# Patient Record
Sex: FEMALE | Race: OTHER | HISPANIC OR LATINO | URBAN - METROPOLITAN AREA
[De-identification: names, ages, dates, MRNs, and addresses within clinical notes are randomized per-mention and may not be internally consistent; named-entity substitution may affect disease eponyms.]

---

## 2024-08-08 ENCOUNTER — EMERGENCY (EMERGENCY)
Facility: HOSPITAL | Age: 39
LOS: 1 days | Discharge: HOME | End: 2024-08-08
Attending: EMERGENCY MEDICINE
Payer: SELF-PAY

## 2024-08-08 VITALS
WEIGHT: 143.3 LBS | SYSTOLIC BLOOD PRESSURE: 141 MMHG | RESPIRATION RATE: 18 BRPM | OXYGEN SATURATION: 98 % | HEART RATE: 77 BPM | TEMPERATURE: 98 F | DIASTOLIC BLOOD PRESSURE: 103 MMHG

## 2024-08-08 PROCEDURE — 12001 RPR S/N/AX/GEN/TRNK 2.5CM/<: CPT

## 2024-08-08 PROCEDURE — T1013: CPT

## 2024-08-08 PROCEDURE — 99282 EMERGENCY DEPT VISIT SF MDM: CPT | Mod: 25

## 2024-08-08 PROCEDURE — 99284 EMERGENCY DEPT VISIT MOD MDM: CPT | Mod: 25

## 2024-08-08 RX ORDER — CEPHALEXIN 250 MG
500 CAPSULE ORAL EVERY 12 HOURS
Refills: 0 | Status: ACTIVE | OUTPATIENT
Start: 2024-08-08 | End: 2025-07-07

## 2024-08-08 RX ORDER — IBUPROFEN 200 MG
600 TABLET ORAL ONCE
Refills: 0 | Status: ACTIVE | OUTPATIENT
Start: 2024-08-08 | End: 2024-08-08

## 2024-08-08 RX ORDER — CEFAZOLIN SODIUM 10 G
1000 VIAL (EA) INJECTION ONCE
Refills: 0 | Status: DISCONTINUED | OUTPATIENT
Start: 2024-08-08 | End: 2024-08-08

## 2024-08-08 RX ORDER — KETOROLAC TROMETHAMINE 10 MG
30 TABLET ORAL ONCE
Refills: 0 | Status: DISCONTINUED | OUTPATIENT
Start: 2024-08-08 | End: 2024-08-08

## 2024-08-08 RX ORDER — CLOSTRIDIUM TETANI TOXOID ANTIGEN (FORMALDEHYDE INACTIVATED), CORYNEBACTERIUM DIPHTHERIAE TOXOID ANTIGEN (FORMALDEHYDE INACTIVATED), BORDETELLA PERTUSSIS TOXOID ANTIGEN (GLUTARALDEHYDE INACTIVATED), BORDETELLA PERTUSSIS FILAMENTOUS HEMAGGLUTININ ANTIGEN (FORMALDEHYDE INACTIVATED), BORDETELLA PERTUSSIS PERTACTIN ANTIGEN, AND BORDETELLA PERTUSSIS FIMBRIAE 2/3 ANTIGEN 5; 2; 2.5; 5; 3; 5 [LF]/.5ML; [LF]/.5ML; UG/.5ML; UG/.5ML; UG/.5ML; UG/.5ML
0.5 INJECTION, SUSPENSION INTRAMUSCULAR ONCE
Refills: 0 | Status: ACTIVE | OUTPATIENT
Start: 2024-08-08 | End: 2024-08-08

## 2024-08-08 NOTE — ED PROVIDER NOTE - NSFOLLOWUPINSTRUCTIONS_ED_ALL_ED_FT
Educación para el paciente: Suturas y grapas (Conceptos Básicos)  Redactado por los médicos y editores de [employer]  There is a newer version of this topic available in English.Hay luis versión de bethany artículo más actualizada disponible en Inglés.  Trixie el Descargo de responsabilidad al final de esta página.    ¿Qué son las suturas?  Las suturas son luis forma en que los médicos pueden cerrar algunos tipos de adam. El médico utiliza aguja e hilo especiales para las suturas, cose los bordes del jerod para unirlos y da puntadas para mantener las suturas en fernandez lugar (figura 1). Las suturas también se llaman “puntos”.    Existen dos tipos principales de sutura:    ?Absorbible – Estas suturas se disuelven con el paso del tiempo. No es necesario sacarlas.    ?No absorbible – Estas suturas se deben sacar después de cierto tiempo. No se disuelven.    En algunos casos (por ejemplo, si tiene un jerod muy profundo), el médico podría colocarle suturas y además algo llamado "pegamento para la piel" o "adhesivo tisular".    ¿Qué son las grapas?  Otra forma que utilizan los médicos para cerrar los adam son las grapas. Las grapas que se usan para curaciones médicas son diferentes a las que se usan para el papel. Para colocarlas, los médicos usan luis grapadora especial (figura 2). Las grapas se deben sacar después de cierto tiempo, al igual que las suturas no absorbibles.    ¿Cómo sé si necesito suturas o grapas?  Un médico o enfermero deberá decidirlo tras examinar el jerod. En general, se necesitan suturas o grapas si el jerod es kassandra, disparejo o tan profundo que atraviesa la piel. Los adam se curan solos sin necesidad de suturas o grapas, aleksandra estas contribuyen a que la curación sea más rápida y no quede mucha cicatriz.    Los adam y las raspaduras pequeños que no son muy profundos por lo general no necesitan suturas. Si se corta y no sabe si necesita suturas, consulte a fernandez médico o enfermero.    ¿Qué pasa cuando me ponen suturas o grapas?  Antes de suturar o colocar las grapas, el médico limpiará susie la herida. Además, le dará un medicamento para adormecer el área, para que no sienta dolor cuando le pongan las suturas o las grapas.    Después de que el médico suture el jerod o le coloque grapas, cubrirá la liane con luis gasa o venda.    ¿Por qué es importante cuidar las suturas o las grapas?  Es importante cuidar las suturas o las grapas para que la herida se cure susie y no se infecte.    ¿Cómo se cuidan las suturas o las grapas?  El médico o enfermero le dará instrucciones específicas, según el tipo de suturas que tenga y la liane del cuerpo donde estén. Las grapas requieren el mismo tipo de cuidado que las suturas no absorbibles.    Estos son algunos consejos generales:    ?Mantenga las suturas o las grapas secas y tapadas con luis venda. Las suturas no absorbibles y las grapas deben mantenerse secas mariaelena 1 a 2 días. Las suturas absorbibles a veces deben mantenerse secas mariaelena más tiempo. El médico o enfermero le dirá exactamente mariaelena cuánto tiempo deberá mantener las suturas secas.    ?Cuando ya no sea necesario mantener las suturas o las grapas secas, lávelas en forma delicada con jabón y agua mientras se ducha. No sumerja las suturas o las grapas en agua, por ejemplo en luis bañera, luis piscina o un kaela. Si lo hace, el proceso de curación puede volverse más lento y es posible que aumenten las posibilidades de contraer luis infección.    ?Después de concetta las suturas o las grapas, séquelas con golpecitos leves y aplíqueles algún ungüento antibiótico    ?Cubra las suturas o las grapas con luis venda o gasa, primo que el médico o enfermero le haya recomendado lo contrario    ?Evite realizar actividades o deportes que puedan lastimar la liane de las suturas o las grapas mariaelena 1 a 2 semanas. (El médico o enfermero le dirá exactamente mariaelena cuánto tiempo debe evitar estas actividades). Si vuelve a lastimarse en el mismo lugar, las suturas se pueden salir y el jerod podría volver a abrirse.    ¿Cuándo mario llamar al médico o enfermero?  Llame a fernandez médico o enfermero si:    ?Se salen las suturas o el jerod se vuelve a abrir    ?Tiene fiebre    ?La liane del jerod se pone belgica o se inflama, o le sale pus de la herida. Es normal que mariaelena los primeros días le salga un líquido amarillo hilda de la herida.    ¿Cuándo me quitarán las suturas o las grapas?  El médico que le ponga las suturas o las grapas le dirá cuándo debe yeny al médico o enfermero para que se las quiten. Las suturas no absorbibles en general se ariana mariaelena 5 a 14 días, según la parte del cuerpo donde se encuentren. Las grapas suelen dejarse mariaelena 7 a 10 días.    Las grapas se deben sacar con luis máquina especial para sacar grapas, aleksandra en los consultorios médicos no siempre tienen bethany aparato. El médico que le coloque las grapas podría darle un aparato para sacarlas. Si es así, llévelo al consultorio de fernandez médico cuando vaya a sacarse las grapas.    ¿Qué mario hacer después de que me quiten las suturas o las grapas?  Después de que le quiten las suturas o las grapas, debe proteger la cicatriz del sol. Use protector solar en la liane o lleve prendas de vestir o sombreros que cubran la cicatriz.    El médico o enfermero podría también recomendarle que use alguna loción o crema para ayudar a curar la herida.

## 2024-08-08 NOTE — ED ADULT TRIAGE NOTE - RESPIRATORY RATE (BREATHS/MIN)
The spot on patient's finger has gotten a little bigger and looks a little darker. Please advise   18

## 2024-08-08 NOTE — ED PROVIDER NOTE - ADDITIONAL NOTES AND INSTRUCTIONS:
PT was evaluated At [facility] ED and was found to have a condition that warranted time of to rest and heal from WORK/SCHOOL.   Clif Lucas PA-C
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
## 2024-08-08 NOTE — ED PROVIDER NOTE - PATIENT PORTAL LINK FT
You can access the FollowMyHealth Patient Portal offered by [facility] by registering at the following website: [email or web address]. By joining [employer]’s FollowMyHealth portal, you will also be able to view your health information using other applications (apps) compatible with our system.

## 2024-08-12 ENCOUNTER — EMERGENCY (EMERGENCY)
Facility: HOSPITAL | Age: 39
LOS: 1 days | Discharge: HOME | End: 2024-08-12
Attending: EMERGENCY MEDICINE
Payer: SELF-PAY

## 2024-08-12 VITALS
HEIGHT: 64 IN | TEMPERATURE: 98 F | WEIGHT: 136.91 LBS | DIASTOLIC BLOOD PRESSURE: 94 MMHG | HEART RATE: 65 BPM | RESPIRATION RATE: 20 BRPM | SYSTOLIC BLOOD PRESSURE: 131 MMHG | OXYGEN SATURATION: 98 %

## 2024-08-12 PROCEDURE — 99283 EMERGENCY DEPT VISIT LOW MDM: CPT

## 2024-08-12 RX ORDER — CEPHALEXIN 250 MG
1 CAPSULE ORAL
Qty: 28 | Refills: 0
Start: 2024-08-12 | End: 2024-08-18

## 2024-08-12 RX ORDER — CEPHALEXIN 250 MG
500 CAPSULE ORAL ONCE
Refills: 0 | Status: COMPLETED | OUTPATIENT
Start: 2024-08-12 | End: 2024-08-12

## 2024-08-12 RX ADMIN — Medication 500 MILLIGRAM(S): at 19:58

## 2024-08-12 NOTE — ED ADULT NURSE NOTE - NSFALLUNIVINTERV_ED_ALL_ED
Bed/Stretcher in lowest position, wheels locked, appropriate side rails in place/Call bell, personal items and telephone in reach/Instruct patient to call for assistance before getting out of bed/chair/stretcher/Non-slip footwear applied when patient is off stretcher/[place] to call system/Physically safe environment - no spills, clutter or unnecessary equipment/Purposeful proactive rounding/Room/bathroom lighting operational, light cord in reach

## 2024-08-12 NOTE — ED PROVIDER NOTE - OBJECTIVE STATEMENT
38 year old female with no med hx presented to ED c/o hand pain, Pt states was evaluated in ED 4 days ago, had sutures placed, now is concerned for infaction, admits to swelling, pain. denies decrease in ROM, discharge., fever/chills, red streaking up to date on tetanus

## 2024-08-12 NOTE — ED PROVIDER NOTE - PATIENT PORTAL LINK FT
You can access the FollowMyHealth Patient Portal offered by [facility] by registering at the following website: [email or web address]. By joining [employer]’s FollowMyHealth portal, you will also be able to view your health information using other applications (apps) compatible with our system.
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
## 2024-08-12 NOTE — ED PROVIDER NOTE - PHYSICAL EXAMINATION
Physical Examination:   Gen: NAD, AOx3  Head: NCAT  HEENT: EOMI, oral mucosa moist, normal conjunctiva, neck supple  Lung: no respiratory distress  CV:  Normal perfusion  Abd: soft, NT ND  MSK: No edema, no visible deformities + healing lac to right 2nd digit pad, mild swelling, tenderness, no redness or  red streaking, no discharge 3 sutures remain in place without wound dehiscence   Neuro: No focal neurologic deficits  Skin: No rash   Psych: normal affect

## 2024-08-12 NOTE — ED PROVIDER NOTE - ATTENDING APP SHARED VISIT CONTRIBUTION OF CARE
Sonal: I performed a face to face bedside interview with patient regarding history of present illness, review of symptoms and past medical history. I completed an independent physical exam.  I have discussed patient's plan of care with advanced care provider.   I agree with note as stated above including HISTORY OF PRESENT ILLNESS, HIV, PAST MEDICAL/SURGICAL/FAMILY/SOCIAL HISTORY, ALLERGIES AND HOME MEDICATIONS, REVIEW OF SYSTEMS, PHYSICAL EXAM, MEDICAL DECISION MAKING and any PROGRESS NOTES during the time I functioned as the attending physician for this patient  unless otherwise noted. My brief assessment is as follows: 4 days ago had lac to right 2nd finger repaired. finger with some swelling, somewhat devitalized tissue. no red streaking, no f/c. no other issues. given abx. return precautions.

## 2024-08-15 ENCOUNTER — EMERGENCY (EMERGENCY)
Facility: HOSPITAL | Age: 39
LOS: 1 days | Discharge: HOME | End: 2024-08-15
Attending: EMERGENCY MEDICINE
Payer: SELF-PAY

## 2024-08-15 VITALS
HEART RATE: 79 BPM | TEMPERATURE: 98 F | DIASTOLIC BLOOD PRESSURE: 79 MMHG | OXYGEN SATURATION: 100 % | RESPIRATION RATE: 17 BRPM | HEIGHT: 64 IN | SYSTOLIC BLOOD PRESSURE: 113 MMHG | WEIGHT: 147.71 LBS

## 2024-08-15 PROCEDURE — G0463: CPT

## 2024-08-15 PROCEDURE — T1013: CPT

## 2024-08-15 PROCEDURE — 73130 X-RAY EXAM OF HAND: CPT

## 2024-08-15 PROCEDURE — 73130 X-RAY EXAM OF HAND: CPT | Mod: 26,LT

## 2024-08-15 PROCEDURE — 99284 EMERGENCY DEPT VISIT MOD MDM: CPT

## 2024-08-15 RX ORDER — IBUPROFEN 200 MG
600 TABLET ORAL ONCE
Refills: 0 | Status: COMPLETED | OUTPATIENT
Start: 2024-08-15 | End: 2024-08-15

## 2024-08-15 RX ADMIN — Medication 600 MILLIGRAM(S): at 21:10

## 2024-08-15 NOTE — ED PROVIDER NOTE - OBJECTIVE STATEMENT
39 yo female with no pmhx presents for suture removal. States that she cut her 2nd left digit on a knife 8/8/24. Had 3 sutures placed was sent home and then returned on 8/12 for swelling and pain to the finger. Was diagnosed with cellulitis at that time and sent home on keflex. Pt returns today for suture removal. States that she is still having pain to that finger, but denies pus or blood drainage, redness to the area, or streaking red. Doc fever, chills, body aches, dizziness, LOC, paresthesias in the extremities, coolness/changes in color to the extremities, rashes.

## 2024-08-15 NOTE — ED ADULT TRIAGE NOTE - CHIEF COMPLAINT QUOTE
pt presents c/o wanting stitches on L pointer finger checked. pt cut finger 7 days ago. site appears slightly red & swollen. was seen on tuesday & given abx

## 2024-08-15 NOTE — ED PROVIDER NOTE - PATIENT PORTAL LINK FT
You can access the FollowMyHealth Patient Portal offered by [facility] by registering at the following website: [email or web address]. By joining [employer]’s FollowMyHealth portal, you will also be able to view your health information using other applications (apps) compatible with our system.

## 2024-08-15 NOTE — ED PROVIDER NOTE - CARE PROVIDER_API CALL
Ti Nicole  Orthopaedic Surgery  [address] [place] [address]  Phone: [phone]  Fax: [phone]  Follow Up Time:

## 2024-08-15 NOTE — ED PROVIDER NOTE - PHYSICAL EXAMINATION
Gen: No acute distress, non toxic  HEENT: Mucous membranes moist, pink conjunctivae, EOMI  CV: RRR  Resp: normal rate and effort  Neuro: A&O x 3, moving all 4 extremities. no fnd's   MSK: No spine or joint tenderness to palpation. +ttp to 2nd left digit pad. compartments soft/compressible.   Skin: + healing lac to left 2nd digit pad, 3 sutures intact, c/d/i. no streaking red, no surrounding redness. no evidence of cellulitic processes. Skin well perfused. cap refill <2sec

## 2024-08-15 NOTE — ED PROVIDER NOTE - NSFOLLOWUPINSTRUCTIONS_ED_ALL_ED_FT
- Please follow-up with your primary care doctor in the next 1-2 days.  Please call tomorrow for an appointment.  If you cannot follow-up with your primary care doctor please return to the ED for any urgent issues.  - You were given a copy of the tests performed today.  Please bring the results with you and review them with your primary care doctor.  - If you have any worsening of symptoms or any other concerns please return to the ED immediately.  - Please continue taking your home medications as directed.   - Follow up with the hand specialist within 3-5 days.     Suture/Staple Removal    After having your stitches or staples removed it is typical to have minor discomfort, swelling, or redness in the area. The wound is still healing so continue to protect it from injury. Keep the wound dry and if given creams, ointments, or medication, take as instructed to by your health care professional.    SEEK IMMEDIATE MEDICAL CARE IF YOU HAVE ANY OF THE FOLLOWING SYMPTOMS: increasing redness/swelling/pain in the wound, pus coming from the wound, bad smell coming from the wound, or fever.    - Rosendo un seguimiento con fernandez médico de atención primaria en los próximos 1 o 2 días.  Por favor llame mañana para luis dell.  Si no puede realizar un seguimiento con fernandez médico de atención primaria, regrese al servicio de urgencias si tiene algún problema urgente.  - Le entregaron luis copia de las pruebas realizadas hoy.  Traiga los resultados con usted y revíselos con fernandez médico de atención primaria.  - Si mily síntomas empeoran o tiene alguna otra inquietud, regrese al servicio de urgencias de inmediato.  - Continúe tomando mily medicamentos caseros según las indicaciones.   - Seguimiento con el especialista en [place] dentro de 3-5 días.     Retiro de suturas/grapas    Después de que le retiren los puntos o las grapas, es típico sentir luis leve molestia, hinchazón o enrojecimiento en el área. La herida aún se está curando, así que continúe protegiéndola de posibles lesiones. Mantenga la herida seca y, si le administran cremas, ungüentos o medicamentos, tómelos según las instrucciones de fernandez profesional de la eduardo.    BUSQUE ATENCIÓN MÉDICA INMEDIATA SI TIENE ALGUNO DE LOS SIGUIENTES SÍNTOMAS: aumento del enrojecimiento/hinchazón/dolor en la herida, pus que sale de la herida, mal olor que sale de la herida o fiebre.

## 2024-08-15 NOTE — ED PROVIDER NOTE - CLINICAL SUMMARY MEDICAL DECISION MAKING FREE TEXT BOX
39 yo female with no pmhx presents for suture removal. had sutures placed on the 8/8 and then was started on abx 8/12 to cover for cellulitis.. neurovascularly intact, no fnd's/. still c/o pain in the finger, xray performed without acute pathology. hand f/u given. 3 sutures were removed, wound care provided. no evidence of cellulitic processes. strict return precautions explained